# Patient Record
Sex: FEMALE | Race: WHITE | Employment: UNEMPLOYED | URBAN - NONMETROPOLITAN AREA
[De-identification: names, ages, dates, MRNs, and addresses within clinical notes are randomized per-mention and may not be internally consistent; named-entity substitution may affect disease eponyms.]

---

## 2017-01-18 ENCOUNTER — OFFICE VISIT (OUTPATIENT)
Dept: PSYCHIATRY | Age: 27
End: 2017-01-18

## 2017-01-18 DIAGNOSIS — F31.81 BIPOLAR 2 DISORDER (HCC): Primary | ICD-10-CM

## 2017-01-18 PROCEDURE — 99213 OFFICE O/P EST LOW 20 MIN: CPT | Performed by: NURSE PRACTITIONER

## 2017-04-26 ENCOUNTER — OFFICE VISIT (OUTPATIENT)
Dept: PSYCHIATRY | Age: 27
End: 2017-04-26

## 2017-04-26 DIAGNOSIS — F31.81 BIPOLAR 2 DISORDER (HCC): Primary | ICD-10-CM

## 2017-04-26 PROCEDURE — 99213 OFFICE O/P EST LOW 20 MIN: CPT | Performed by: NURSE PRACTITIONER

## 2017-04-26 RX ORDER — SERTRALINE HYDROCHLORIDE 100 MG/1
100 TABLET, FILM COATED ORAL DAILY
Qty: 30 TABLET | Refills: 1 | Status: SHIPPED | OUTPATIENT
Start: 2017-04-26 | End: 2017-05-24 | Stop reason: SDUPTHER

## 2017-05-24 ENCOUNTER — OFFICE VISIT (OUTPATIENT)
Dept: PSYCHIATRY | Age: 27
End: 2017-05-24

## 2017-05-24 DIAGNOSIS — F31.81 BIPOLAR 2 DISORDER (HCC): Primary | ICD-10-CM

## 2017-05-24 PROCEDURE — 99213 OFFICE O/P EST LOW 20 MIN: CPT | Performed by: NURSE PRACTITIONER

## 2017-05-24 RX ORDER — SERTRALINE HYDROCHLORIDE 100 MG/1
100 TABLET, FILM COATED ORAL DAILY
Qty: 30 TABLET | Refills: 2 | Status: SHIPPED | OUTPATIENT
Start: 2017-05-24 | End: 2017-07-10 | Stop reason: SDUPTHER

## 2017-07-10 RX ORDER — SERTRALINE HYDROCHLORIDE 100 MG/1
100 TABLET, FILM COATED ORAL DAILY
Qty: 30 TABLET | Refills: 2 | Status: SHIPPED | OUTPATIENT
Start: 2017-07-10 | End: 2017-09-06 | Stop reason: SDUPTHER

## 2017-07-24 ENCOUNTER — HOSPITAL ENCOUNTER (OUTPATIENT)
Age: 27
Setting detail: OBSERVATION
Discharge: PSYCHIATRIC OR SUBSTANCE USE FACILITY | DRG: 751 | End: 2017-07-25
Attending: EMERGENCY MEDICINE | Admitting: INTERNAL MEDICINE
Payer: COMMERCIAL

## 2017-07-24 DIAGNOSIS — R45.1 AGITATION: ICD-10-CM

## 2017-07-24 DIAGNOSIS — T50.902A OVERDOSE, INTENTIONAL SELF-HARM, INITIAL ENCOUNTER (HCC): Primary | ICD-10-CM

## 2017-07-24 LAB
ACETAMINOPHEN LEVEL: < 5 UG/ML (ref 0–20)
ALBUMIN SERPL-MCNC: 4 G/DL (ref 3.5–5.1)
ALP BLD-CCNC: 94 U/L (ref 38–126)
ALT SERPL-CCNC: 9 U/L (ref 11–66)
AMPHETAMINE+METHAMPHETAMINE URINE SCREEN: NEGATIVE
ANION GAP SERPL CALCULATED.3IONS-SCNC: 14 MEQ/L (ref 8–16)
AST SERPL-CCNC: 11 U/L (ref 5–40)
BARBITURATE QUANTITATIVE URINE: NEGATIVE
BASOPHILS # BLD: 0.7 %
BASOPHILS ABSOLUTE: 0.1 THOU/MM3 (ref 0–0.1)
BENZODIAZEPINE QUANTITATIVE URINE: NEGATIVE
BILIRUB SERPL-MCNC: < 0.2 MG/DL (ref 0.3–1.2)
BILIRUBIN DIRECT: < 0.2 MG/DL (ref 0–0.3)
BUN BLDV-MCNC: 10 MG/DL (ref 7–22)
CALCIUM SERPL-MCNC: 8.7 MG/DL (ref 8.5–10.5)
CANNABINOID QUANTITATIVE URINE: POSITIVE
CHLORIDE BLD-SCNC: 104 MEQ/L (ref 98–111)
CO2: 23 MEQ/L (ref 23–33)
COCAINE METABOLITE QUANTITATIVE URINE: NEGATIVE
CREAT SERPL-MCNC: 0.7 MG/DL (ref 0.4–1.2)
EOSINOPHIL # BLD: 1.3 %
EOSINOPHILS ABSOLUTE: 0.2 THOU/MM3 (ref 0–0.4)
ETHYL ALCOHOL, SERUM: < 0.01 %
GFR SERPL CREATININE-BSD FRML MDRD: > 90 ML/MIN/1.73M2
GLUCOSE BLD-MCNC: 115 MG/DL (ref 70–108)
HCT VFR BLD CALC: 42.9 % (ref 37–47)
HEMOGLOBIN: 14.5 GM/DL (ref 12–16)
LYMPHOCYTES # BLD: 28.6 %
LYMPHOCYTES ABSOLUTE: 3.3 THOU/MM3 (ref 1–4.8)
MCH RBC QN AUTO: 31.8 PG (ref 27–31)
MCHC RBC AUTO-ENTMCNC: 33.9 GM/DL (ref 33–37)
MCV RBC AUTO: 93.7 FL (ref 81–99)
MONOCYTES # BLD: 5.9 %
MONOCYTES ABSOLUTE: 0.7 THOU/MM3 (ref 0.4–1.3)
NUCLEATED RED BLOOD CELLS: 0 /100 WBC
OPIATES, URINE: NEGATIVE
OSMOLALITY CALCULATION: 281.2 MOSMOL/KG (ref 275–300)
OXYCODONE: NEGATIVE
PDW BLD-RTO: 13.9 % (ref 11.5–14.5)
PHENCYCLIDINE QUANTITATIVE URINE: POSITIVE
PLATELET # BLD: 363 THOU/MM3 (ref 130–400)
PMV BLD AUTO: 6.9 MCM (ref 7.4–10.4)
POTASSIUM SERPL-SCNC: 4 MEQ/L (ref 3.5–5.2)
PREGNANCY, SERUM: NEGATIVE
RBC # BLD: 4.58 MILL/MM3 (ref 4.2–5.4)
RBC # BLD: NORMAL 10*6/UL
SALICYLATE, SERUM: < 0.3 MG/DL (ref 2–10)
SEG NEUTROPHILS: 63.5 %
SEGMENTED NEUTROPHILS ABSOLUTE COUNT: 7.4 THOU/MM3 (ref 1.8–7.7)
SODIUM BLD-SCNC: 141 MEQ/L (ref 135–145)
TOTAL PROTEIN: 7 G/DL (ref 6.1–8)
TSH SERPL DL<=0.05 MIU/L-ACNC: 5.12 UIU/ML (ref 0.4–4.2)
WBC # BLD: 11.6 THOU/MM3 (ref 4.8–10.8)

## 2017-07-24 PROCEDURE — 80329 ANALGESICS NON-OPIOID 1 OR 2: CPT

## 2017-07-24 PROCEDURE — 99220 PR INITIAL OBSERVATION CARE/DAY 70 MINUTES: CPT | Performed by: INTERNAL MEDICINE

## 2017-07-24 PROCEDURE — 82248 BILIRUBIN DIRECT: CPT

## 2017-07-24 PROCEDURE — 93005 ELECTROCARDIOGRAM TRACING: CPT

## 2017-07-24 PROCEDURE — 99285 EMERGENCY DEPT VISIT HI MDM: CPT

## 2017-07-24 PROCEDURE — 84703 CHORIONIC GONADOTROPIN ASSAY: CPT

## 2017-07-24 PROCEDURE — 36415 COLL VENOUS BLD VENIPUNCTURE: CPT

## 2017-07-24 PROCEDURE — 84443 ASSAY THYROID STIM HORMONE: CPT

## 2017-07-24 PROCEDURE — 85025 COMPLETE CBC W/AUTO DIFF WBC: CPT

## 2017-07-24 PROCEDURE — 96375 TX/PRO/DX INJ NEW DRUG ADDON: CPT

## 2017-07-24 PROCEDURE — 80320 DRUG SCREEN QUANTALCOHOLS: CPT

## 2017-07-24 PROCEDURE — G0378 HOSPITAL OBSERVATION PER HR: HCPCS

## 2017-07-24 PROCEDURE — 80307 DRUG TEST PRSMV CHEM ANLYZR: CPT

## 2017-07-24 PROCEDURE — 2580000003 HC RX 258: Performed by: INTERNAL MEDICINE

## 2017-07-24 PROCEDURE — 90791 PSYCH DIAGNOSTIC EVALUATION: CPT | Performed by: NURSE PRACTITIONER

## 2017-07-24 PROCEDURE — G0480 DRUG TEST DEF 1-7 CLASSES: HCPCS

## 2017-07-24 PROCEDURE — 6360000002 HC RX W HCPCS

## 2017-07-24 PROCEDURE — 96361 HYDRATE IV INFUSION ADD-ON: CPT

## 2017-07-24 PROCEDURE — 80053 COMPREHEN METABOLIC PANEL: CPT

## 2017-07-24 PROCEDURE — 2580000003 HC RX 258: Performed by: EMERGENCY MEDICINE

## 2017-07-24 PROCEDURE — 6360000002 HC RX W HCPCS: Performed by: EMERGENCY MEDICINE

## 2017-07-24 PROCEDURE — 96374 THER/PROPH/DIAG INJ IV PUSH: CPT

## 2017-07-24 RX ORDER — LORAZEPAM 2 MG/ML
1 INJECTION INTRAMUSCULAR ONCE
Status: COMPLETED | OUTPATIENT
Start: 2017-07-24 | End: 2017-07-24

## 2017-07-24 RX ORDER — ONDANSETRON 2 MG/ML
INJECTION INTRAMUSCULAR; INTRAVENOUS
Status: COMPLETED
Start: 2017-07-24 | End: 2017-07-24

## 2017-07-24 RX ORDER — ONDANSETRON 2 MG/ML
4 INJECTION INTRAMUSCULAR; INTRAVENOUS EVERY 6 HOURS PRN
Status: DISCONTINUED | OUTPATIENT
Start: 2017-07-24 | End: 2017-07-25 | Stop reason: HOSPADM

## 2017-07-24 RX ORDER — SODIUM CHLORIDE 9 MG/ML
INJECTION, SOLUTION INTRAVENOUS CONTINUOUS
Status: DISCONTINUED | OUTPATIENT
Start: 2017-07-24 | End: 2017-07-25 | Stop reason: HOSPADM

## 2017-07-24 RX ORDER — SODIUM CHLORIDE 0.9 % (FLUSH) 0.9 %
10 SYRINGE (ML) INJECTION PRN
Status: DISCONTINUED | OUTPATIENT
Start: 2017-07-24 | End: 2017-07-25 | Stop reason: HOSPADM

## 2017-07-24 RX ORDER — ONDANSETRON 2 MG/ML
4 INJECTION INTRAMUSCULAR; INTRAVENOUS ONCE
Status: COMPLETED | OUTPATIENT
Start: 2017-07-24 | End: 2017-07-24

## 2017-07-24 RX ORDER — SODIUM CHLORIDE 450 MG/100ML
INJECTION, SOLUTION INTRAVENOUS CONTINUOUS
Status: DISCONTINUED | OUTPATIENT
Start: 2017-07-24 | End: 2017-07-25 | Stop reason: HOSPADM

## 2017-07-24 RX ORDER — SODIUM CHLORIDE 0.9 % (FLUSH) 0.9 %
10 SYRINGE (ML) INJECTION EVERY 12 HOURS SCHEDULED
Status: DISCONTINUED | OUTPATIENT
Start: 2017-07-24 | End: 2017-07-25 | Stop reason: HOSPADM

## 2017-07-24 RX ADMIN — SODIUM CHLORIDE, PRESERVATIVE FREE 10 ML: 5 INJECTION INTRAVENOUS at 08:28

## 2017-07-24 RX ADMIN — SODIUM CHLORIDE: 4.5 INJECTION, SOLUTION INTRAVENOUS at 21:00

## 2017-07-24 RX ADMIN — LORAZEPAM 1 MG: 2 INJECTION INTRAMUSCULAR; INTRAVENOUS at 03:22

## 2017-07-24 RX ADMIN — ONDANSETRON 4 MG: 2 INJECTION INTRAMUSCULAR; INTRAVENOUS at 02:37

## 2017-07-24 RX ADMIN — Medication 10 ML: at 21:00

## 2017-07-24 RX ADMIN — SODIUM CHLORIDE: 4.5 INJECTION, SOLUTION INTRAVENOUS at 08:27

## 2017-07-24 RX ADMIN — SODIUM CHLORIDE: 4.5 INJECTION, SOLUTION INTRAVENOUS at 18:56

## 2017-07-24 RX ADMIN — SODIUM CHLORIDE: 9 INJECTION, SOLUTION INTRAVENOUS at 01:55

## 2017-07-24 ASSESSMENT — PAIN SCALES - GENERAL: PAINLEVEL_OUTOF10: 0

## 2017-07-24 ASSESSMENT — ENCOUNTER SYMPTOMS
NAUSEA: 0
BACK PAIN: 0
DIARRHEA: 0
WHEEZING: 0
COUGH: 0
ABDOMINAL PAIN: 0
SHORTNESS OF BREATH: 0
BLOOD IN STOOL: 0
VOMITING: 0
SORE THROAT: 0

## 2017-07-25 ENCOUNTER — HOSPITAL ENCOUNTER (INPATIENT)
Age: 27
LOS: 2 days | Discharge: HOME | DRG: 751 | End: 2017-07-27
Attending: PSYCHIATRY & NEUROLOGY | Admitting: PSYCHIATRY & NEUROLOGY
Payer: COMMERCIAL

## 2017-07-25 VITALS
TEMPERATURE: 98.4 F | BODY MASS INDEX: 36.62 KG/M2 | HEART RATE: 82 BPM | RESPIRATION RATE: 18 BRPM | SYSTOLIC BLOOD PRESSURE: 149 MMHG | HEIGHT: 68 IN | DIASTOLIC BLOOD PRESSURE: 99 MMHG | WEIGHT: 241.6 LBS | OXYGEN SATURATION: 94 %

## 2017-07-25 LAB
ANION GAP SERPL CALCULATED.3IONS-SCNC: 13 MEQ/L (ref 8–16)
BUN BLDV-MCNC: 15 MG/DL (ref 7–22)
CALCIUM SERPL-MCNC: 8.8 MG/DL (ref 8.5–10.5)
CHLORIDE BLD-SCNC: 101 MEQ/L (ref 98–111)
CO2: 24 MEQ/L (ref 23–33)
CREAT SERPL-MCNC: 0.9 MG/DL (ref 0.4–1.2)
GFR SERPL CREATININE-BSD FRML MDRD: 75 ML/MIN/1.73M2
GLUCOSE BLD-MCNC: 94 MG/DL (ref 70–108)
HCT VFR BLD CALC: 42.4 % (ref 37–47)
HEMOGLOBIN: 14.3 GM/DL (ref 12–16)
MCH RBC QN AUTO: 32.2 PG (ref 27–31)
MCHC RBC AUTO-ENTMCNC: 33.6 GM/DL (ref 33–37)
MCV RBC AUTO: 95.6 FL (ref 81–99)
PDW BLD-RTO: 13.3 % (ref 11.5–14.5)
PLATELET # BLD: 297 THOU/MM3 (ref 130–400)
PMV BLD AUTO: 6.7 MCM (ref 7.4–10.4)
POTASSIUM SERPL-SCNC: 4.3 MEQ/L (ref 3.5–5.2)
RBC # BLD: 4.43 MILL/MM3 (ref 4.2–5.4)
SODIUM BLD-SCNC: 138 MEQ/L (ref 135–145)
WBC # BLD: 9.4 THOU/MM3 (ref 4.8–10.8)

## 2017-07-25 PROCEDURE — A6198 ALGINATE DRESSING > 48 SQ IN: HCPCS

## 2017-07-25 PROCEDURE — 36415 COLL VENOUS BLD VENIPUNCTURE: CPT

## 2017-07-25 PROCEDURE — 96361 HYDRATE IV INFUSION ADD-ON: CPT

## 2017-07-25 PROCEDURE — 1240000000 HC EMOTIONAL WELLNESS R&B

## 2017-07-25 PROCEDURE — 99217 PR OBSERVATION CARE DISCHARGE MANAGEMENT: CPT | Performed by: NURSE PRACTITIONER

## 2017-07-25 PROCEDURE — 80048 BASIC METABOLIC PNL TOTAL CA: CPT

## 2017-07-25 PROCEDURE — G0378 HOSPITAL OBSERVATION PER HR: HCPCS

## 2017-07-25 PROCEDURE — 85027 COMPLETE CBC AUTOMATED: CPT

## 2017-07-25 PROCEDURE — 2580000003 HC RX 258: Performed by: INTERNAL MEDICINE

## 2017-07-25 RX ORDER — LAMOTRIGINE 100 MG/1
400 TABLET ORAL DAILY
Status: DISCONTINUED | OUTPATIENT
Start: 2017-07-27 | End: 2017-07-27 | Stop reason: HOSPADM

## 2017-07-25 RX ORDER — LAMOTRIGINE 100 MG/1
400 TABLET ORAL DAILY
Status: CANCELLED | OUTPATIENT
Start: 2017-07-27

## 2017-07-25 RX ADMIN — SODIUM CHLORIDE: 4.5 INJECTION, SOLUTION INTRAVENOUS at 06:49

## 2017-07-25 ASSESSMENT — PAIN SCALES - GENERAL
PAINLEVEL_OUTOF10: 0

## 2017-07-25 ASSESSMENT — SLEEP AND FATIGUE QUESTIONNAIRES
DO YOU USE A SLEEP AID: NO
AVERAGE NUMBER OF SLEEP HOURS: 10
DO YOU HAVE DIFFICULTY SLEEPING: NO

## 2017-07-26 LAB
EKG ATRIAL RATE: 79 BPM
EKG P AXIS: 42 DEGREES
EKG P-R INTERVAL: 132 MS
EKG Q-T INTERVAL: 368 MS
EKG QRS DURATION: 82 MS
EKG QTC CALCULATION (BAZETT): 421 MS
EKG R AXIS: 18 DEGREES
EKG T AXIS: 18 DEGREES
EKG VENTRICULAR RATE: 79 BPM

## 2017-07-26 PROCEDURE — 6370000000 HC RX 637 (ALT 250 FOR IP): Performed by: NURSE PRACTITIONER

## 2017-07-26 PROCEDURE — 90792 PSYCH DIAG EVAL W/MED SRVCS: CPT | Performed by: NURSE PRACTITIONER

## 2017-07-26 PROCEDURE — 1240000000 HC EMOTIONAL WELLNESS R&B

## 2017-07-26 PROCEDURE — 6370000000 HC RX 637 (ALT 250 FOR IP): Performed by: PSYCHIATRY & NEUROLOGY

## 2017-07-26 RX ORDER — ACETAMINOPHEN 325 MG/1
650 TABLET ORAL EVERY 8 HOURS PRN
Status: DISCONTINUED | OUTPATIENT
Start: 2017-07-26 | End: 2017-07-26

## 2017-07-26 RX ORDER — ACETAMINOPHEN 325 MG/1
650 TABLET ORAL EVERY 8 HOURS PRN
Status: DISCONTINUED | OUTPATIENT
Start: 2017-07-26 | End: 2017-07-27

## 2017-07-26 RX ORDER — TRAZODONE HYDROCHLORIDE 50 MG/1
50 TABLET ORAL NIGHTLY PRN
Status: DISCONTINUED | OUTPATIENT
Start: 2017-07-26 | End: 2017-07-27 | Stop reason: HOSPADM

## 2017-07-26 RX ORDER — SERTRALINE HYDROCHLORIDE 100 MG/1
100 TABLET, FILM COATED ORAL DAILY
Status: DISCONTINUED | OUTPATIENT
Start: 2017-07-26 | End: 2017-07-27 | Stop reason: HOSPADM

## 2017-07-26 RX ADMIN — ACETAMINOPHEN 650 MG: 325 TABLET ORAL at 15:11

## 2017-07-26 RX ADMIN — SERTRALINE 100 MG: 100 TABLET, FILM COATED ORAL at 12:12

## 2017-07-26 ASSESSMENT — PAIN DESCRIPTION - PROGRESSION: CLINICAL_PROGRESSION: GRADUALLY IMPROVING

## 2017-07-26 ASSESSMENT — PAIN DESCRIPTION - ONSET: ONSET: ON-GOING

## 2017-07-26 ASSESSMENT — PAIN DESCRIPTION - ORIENTATION: ORIENTATION: LOWER

## 2017-07-26 ASSESSMENT — PAIN SCALES - GENERAL
PAINLEVEL_OUTOF10: 1
PAINLEVEL_OUTOF10: 7
PAINLEVEL_OUTOF10: 2

## 2017-07-26 ASSESSMENT — PAIN DESCRIPTION - FREQUENCY: FREQUENCY: INTERMITTENT

## 2017-07-26 ASSESSMENT — PAIN DESCRIPTION - LOCATION: LOCATION: PELVIS

## 2017-07-26 ASSESSMENT — PAIN DESCRIPTION - PAIN TYPE: TYPE: ACUTE PAIN

## 2017-07-26 ASSESSMENT — PAIN DESCRIPTION - DESCRIPTORS: DESCRIPTORS: CRAMPING

## 2017-07-27 VITALS
OXYGEN SATURATION: 96 % | WEIGHT: 237 LBS | SYSTOLIC BLOOD PRESSURE: 128 MMHG | HEIGHT: 68 IN | TEMPERATURE: 97.4 F | BODY MASS INDEX: 35.92 KG/M2 | DIASTOLIC BLOOD PRESSURE: 94 MMHG | HEART RATE: 87 BPM | RESPIRATION RATE: 16 BRPM

## 2017-07-27 PROCEDURE — 99238 HOSP IP/OBS DSCHRG MGMT 30/<: CPT | Performed by: NURSE PRACTITIONER

## 2017-07-27 PROCEDURE — 5130000000 HC BRIDGE APPOINTMENT

## 2017-07-27 PROCEDURE — 6370000000 HC RX 637 (ALT 250 FOR IP): Performed by: PSYCHIATRY & NEUROLOGY

## 2017-07-27 PROCEDURE — 6370000000 HC RX 637 (ALT 250 FOR IP): Performed by: NURSE PRACTITIONER

## 2017-07-27 RX ORDER — LAMOTRIGINE 200 MG/1
400 TABLET ORAL DAILY
Qty: 60 TABLET | Refills: 0 | Status: ON HOLD | OUTPATIENT
Start: 2017-07-27 | End: 2020-06-01 | Stop reason: HOSPADM

## 2017-07-27 RX ORDER — TRAZODONE HYDROCHLORIDE 50 MG/1
50 TABLET ORAL NIGHTLY PRN
Qty: 30 TABLET | Refills: 0 | Status: ON HOLD | OUTPATIENT
Start: 2017-07-27 | End: 2020-06-01 | Stop reason: SDUPTHER

## 2017-07-27 RX ORDER — ACETAMINOPHEN 325 MG/1
650 TABLET ORAL EVERY 8 HOURS PRN
Status: DISCONTINUED | OUTPATIENT
Start: 2017-07-27 | End: 2017-07-27 | Stop reason: HOSPADM

## 2017-07-27 RX ADMIN — LAMOTRIGINE 400 MG: 100 TABLET ORAL at 08:01

## 2017-07-27 RX ADMIN — ACETAMINOPHEN 650 MG: 325 TABLET ORAL at 07:58

## 2017-07-27 RX ADMIN — SERTRALINE 100 MG: 100 TABLET, FILM COATED ORAL at 08:01

## 2017-07-27 ASSESSMENT — PAIN DESCRIPTION - ONSET: ONSET: ON-GOING

## 2017-07-27 ASSESSMENT — PAIN DESCRIPTION - ORIENTATION: ORIENTATION: LOWER;MID

## 2017-07-27 ASSESSMENT — PAIN DESCRIPTION - PAIN TYPE: TYPE: ACUTE PAIN

## 2017-07-27 ASSESSMENT — PAIN DESCRIPTION - PROGRESSION: CLINICAL_PROGRESSION: GRADUALLY IMPROVING

## 2017-07-27 ASSESSMENT — PAIN SCALES - GENERAL
PAINLEVEL_OUTOF10: 6
PAINLEVEL_OUTOF10: 4
PAINLEVEL_OUTOF10: 4

## 2017-07-27 ASSESSMENT — PAIN DESCRIPTION - DESCRIPTORS: DESCRIPTORS: CRAMPING

## 2017-07-27 ASSESSMENT — PAIN DESCRIPTION - LOCATION: LOCATION: ABDOMEN

## 2017-09-06 ENCOUNTER — OFFICE VISIT (OUTPATIENT)
Dept: PSYCHIATRY | Age: 27
End: 2017-09-06
Payer: COMMERCIAL

## 2017-09-06 DIAGNOSIS — F31.81 BIPOLAR 2 DISORDER (HCC): ICD-10-CM

## 2017-09-06 DIAGNOSIS — F60.3 BORDERLINE PERSONALITY DISORDER (HCC): Primary | ICD-10-CM

## 2017-09-06 PROCEDURE — 99213 OFFICE O/P EST LOW 20 MIN: CPT | Performed by: NURSE PRACTITIONER

## 2017-09-06 RX ORDER — SERTRALINE HYDROCHLORIDE 100 MG/1
100 TABLET, FILM COATED ORAL DAILY
Qty: 30 TABLET | Refills: 1 | Status: SHIPPED | OUTPATIENT
Start: 2017-09-06 | End: 2017-10-04 | Stop reason: SDUPTHER

## 2017-10-04 ENCOUNTER — OFFICE VISIT (OUTPATIENT)
Dept: PSYCHIATRY | Age: 27
End: 2017-10-04
Payer: COMMERCIAL

## 2017-10-04 DIAGNOSIS — F60.3 BORDERLINE PERSONALITY DISORDER (HCC): ICD-10-CM

## 2017-10-04 DIAGNOSIS — F31.81 BIPOLAR II DISORDER (HCC): ICD-10-CM

## 2017-10-04 DIAGNOSIS — F12.10 CANNABIS ABUSE: ICD-10-CM

## 2017-10-04 PROCEDURE — 99213 OFFICE O/P EST LOW 20 MIN: CPT | Performed by: NURSE PRACTITIONER

## 2017-10-04 RX ORDER — SERTRALINE HYDROCHLORIDE 100 MG/1
100 TABLET, FILM COATED ORAL DAILY
Qty: 30 TABLET | Refills: 1 | Status: ON HOLD | OUTPATIENT
Start: 2017-10-04 | End: 2020-06-01 | Stop reason: HOSPADM

## 2017-10-04 NOTE — PROGRESS NOTES
Subjective:      Patient ID: Anai Major is a 31 y.o. female. HPI  Herbert Kohler is seen for follow up and medication for medication management. Feels current meds are working well. Denies side effects from meds. Denies having a rash. Denies depression. Denies feelings of self harm. Reports appetite is good and is sleeping well. Reports broke up with boyfriend which is a good thing and is now living with brother in [facility] and feel this is a good place. States she is in the process of finding a counselor. Reviewed labs drawn 7-24-17 and &-25-17 no critical abnormals noted. Noted urine tox screen positive for cannabis Reports smoking cannabis daily. Denies this being a problem. Reports last had seizure 2 weeks ago when broke up with boyfriend.       Past Medical Hx:  Reports allergy to Ibuprofen. States makes her Nauseated. Denies any other drug allergies  Reports having a seizure D/O. Denies any other chronic or acute medical illnesses. Denies possibility of being pregnant. Reports just finished menses. Reports will start Birth control soon.       Past Psychiatric Hx:  Reports one psych hospitalization at [facility], April 2016. Denies any past suicidal gestures. Reports being under care of psychiatrist in [place]. Reports past psych meds. Xanax. Zoloft. Lamictal.     Family Hx:  Not sure of any family Hx of mental illness.      Social Hx:  TOBACCO: Reports smokes 1 pk cigarettes every 2-4 days  ETOH: Reports being a social drinker 2 x monthly  DRUGS: Reports uses cannabis 4-5 days a week. Last used yesterday. MARITAL STATUS: Never . Currently in relationship for one year. Reports boyfriend is supportive. 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   LEVEL OF EDUCATION: Left school in 11th grade  LIVING SITUATION: Lives with brother in [address] [place] [address]  LEGAL: Arrested for marijuana possession.      MSE:  Level of consciousness: Alert  Appearance: in chair and fair grooming

## 2017-10-04 NOTE — MR AVS SNAPSHOT
After Visit Summary             Kate Overton   10/4/2017 5:00 PM   Office Visit    Description:  Female : 1990   Provider:  Nati Pearson CNP   Department:  [facility]              Your Follow-Up and Future Appointments         Below is a list of your follow-up and future appointments. This may not be a complete list as you may have made appointments directly with providers that we are not aware of or your providers may have made some for you. Please call your providers to confirm appointments. It is important to keep your appointments. Please bring your current insurance card, photo ID, co-pay, and all medication bottles to your appointment. If self-pay, payment is expected at the time of service. Information from Your Visit        Department     Name Address Phone Fax    [facility] [address] [facility] [phone]      You Were Seen for:         Comments    Bipolar II disorder [facility])   [[id number]]         Vital Signs     Smoking Status                   Current Some Day Smoker           Additional Information about your Body Mass Index (BMI)           Your BMI as listed above is considered obese (30 or more). BMI is an estimate of body fat, calculated from your height and weight. The higher your BMI, the greater your risk of heart disease, high blood pressure, type 2 diabetes, stroke, gallstones, arthritis, sleep apnea, and certain cancers. BMI is not perfect. It may overestimate body fat in athletes and people who are more muscular. Even a small weight loss (between 5 and 10 percent of your current weight) by decreasing your calorie intake and becoming more physically active will help lower your risk of developing or worsening diseases associated with obesity.      Learn more at: [email or web address]             Where to Get Your Medications You can get these medications from any pharmacy     Bring a paper prescription for each of these medications     sertraline 100 MG tablet         Your Current Medications Are              sertraline (ZOLOFT) 100 MG tablet Take 1 tablet by mouth daily    lamoTRIgine (LAMICTAL) 200 MG tablet Take 2 tablets by mouth daily    traZODone (DESYREL) 50 MG tablet Take 1 tablet by mouth nightly as needed for Sleep      Allergies              Ibuprofen     Causes stomach problems         Additional Information        Basic Information     Date Of Birth Sex Race Ethnicity Preferred Language Preferred Written Language    1990 Female White Non-/Non  English English      Problem List as of 10/4/2017  Date Reviewed: 10/4/2017                Bipolar II disorder ([facility] [address]    Overdose      Preventive Care        Date Due    HIV screening is recommended for all people regardless of risk factors  aged 15-65 years at least once (lifetime) who have never been HIV tested. 12/2/2004    Tetanus Combination Vaccine (1 - Tdap) 12/2/2008    Pneumococcal Vaccine - Pneumovax for adults aged 19-64 years with: chronic heart disease, chronic lung disease, diabetes mellitus, alcoholism, chronic liver disease, or cigarette smoking. (1 of 1 - PPSV23) 12/2/2008    Pap Smear 12/2/2010    Yearly Flu Vaccine (1) 9/1/2017            [employer] Signup           [employer] allows you to send messages to your doctor, view your test results, renew your prescriptions, schedule appointments, view visit notes, and more. How Do I Sign Up? 1. In your Internet browser, go to [email or web address]  2. Click on the Sign Up Now link in the Sign In box. You will see the New Member Sign Up page. 3. Enter your [employer] Access Code exactly as it appears below. You will not need to use this code after youve completed the sign-up process. If you do not sign up before the expiration date, you must request a new code.

## 2020-05-28 ENCOUNTER — HOSPITAL ENCOUNTER (INPATIENT)
Age: 30
LOS: 3 days | Discharge: HOME | DRG: 754 | End: 2020-06-01
Attending: PSYCHIATRY & NEUROLOGY | Admitting: PSYCHIATRY & NEUROLOGY
Payer: COMMERCIAL

## 2020-05-28 PROCEDURE — G0480 DRUG TEST DEF 1-7 CLASSES: HCPCS

## 2020-05-28 PROCEDURE — 99285 EMERGENCY DEPT VISIT HI MDM: CPT

## 2020-05-28 PROCEDURE — 80053 COMPREHEN METABOLIC PANEL: CPT

## 2020-05-28 PROCEDURE — 85025 COMPLETE CBC W/AUTO DIFF WBC: CPT

## 2020-05-28 PROCEDURE — 82248 BILIRUBIN DIRECT: CPT

## 2020-05-28 PROCEDURE — 36415 COLL VENOUS BLD VENIPUNCTURE: CPT

## 2020-05-28 PROCEDURE — 84443 ASSAY THYROID STIM HORMONE: CPT

## 2020-05-28 PROCEDURE — 84703 CHORIONIC GONADOTROPIN ASSAY: CPT

## 2020-05-28 RX ORDER — ACETAMINOPHEN 325 MG/1
650 TABLET ORAL ONCE
Status: COMPLETED | OUTPATIENT
Start: 2020-05-29 | End: 2020-05-29

## 2020-05-28 RX ORDER — LORAZEPAM 1 MG/1
1 TABLET ORAL ONCE
Status: COMPLETED | OUTPATIENT
Start: 2020-05-29 | End: 2020-05-29

## 2020-05-28 ASSESSMENT — PAIN DESCRIPTION - LOCATION: LOCATION: HAND

## 2020-05-28 ASSESSMENT — PAIN DESCRIPTION - ORIENTATION: ORIENTATION: RIGHT

## 2020-05-28 ASSESSMENT — PAIN SCALES - GENERAL: PAINLEVEL_OUTOF10: 3

## 2020-05-29 PROBLEM — F32.9 MAJOR DEPRESSIVE DISORDER, SINGLE EPISODE: Status: ACTIVE | Noted: 2020-05-29

## 2020-05-29 PROBLEM — F33.2 SEVERE EPISODE OF RECURRENT MAJOR DEPRESSIVE DISORDER, WITHOUT PSYCHOTIC FEATURES (HCC): Status: ACTIVE | Noted: 2020-05-29

## 2020-05-29 LAB
ACETAMINOPHEN LEVEL: < 5 UG/ML (ref 0–20)
ALBUMIN SERPL-MCNC: 4.1 G/DL (ref 3.5–5.1)
ALP BLD-CCNC: 73 U/L (ref 38–126)
ALT SERPL-CCNC: 16 U/L (ref 11–66)
AMPHETAMINE+METHAMPHETAMINE URINE SCREEN: NEGATIVE
ANION GAP SERPL CALCULATED.3IONS-SCNC: 12 MEQ/L (ref 8–16)
AST SERPL-CCNC: 17 U/L (ref 5–40)
ATYPICAL LYMPHOCYTES: ABNORMAL %
BARBITURATE QUANTITATIVE URINE: NEGATIVE
BASOPHILS # BLD: 0.7 %
BASOPHILS ABSOLUTE: 0.1 THOU/MM3 (ref 0–0.1)
BENZODIAZEPINE QUANTITATIVE URINE: NEGATIVE
BILIRUB SERPL-MCNC: 0.2 MG/DL (ref 0.3–1.2)
BILIRUBIN DIRECT: < 0.2 MG/DL (ref 0–0.3)
BILIRUBIN URINE: NEGATIVE
BLOOD, URINE: NEGATIVE
BUN BLDV-MCNC: 16 MG/DL (ref 7–22)
CALCIUM SERPL-MCNC: 9.6 MG/DL (ref 8.5–10.5)
CANNABINOID QUANTITATIVE URINE: POSITIVE
CHARACTER, URINE: CLEAR
CHLORIDE BLD-SCNC: 101 MEQ/L (ref 98–111)
CO2: 23 MEQ/L (ref 23–33)
COCAINE METABOLITE QUANTITATIVE URINE: NEGATIVE
COLOR: YELLOW
CREAT SERPL-MCNC: 0.8 MG/DL (ref 0.4–1.2)
EOSINOPHIL # BLD: 1.5 %
EOSINOPHILS ABSOLUTE: 0.2 THOU/MM3 (ref 0–0.4)
ERYTHROCYTE [DISTWIDTH] IN BLOOD BY AUTOMATED COUNT: 12.4 % (ref 11.5–14.5)
ERYTHROCYTE [DISTWIDTH] IN BLOOD BY AUTOMATED COUNT: 46.5 FL (ref 35–45)
ETHYL ALCOHOL, SERUM: < 0.01 %
GFR SERPL CREATININE-BSD FRML MDRD: 84 ML/MIN/1.73M2
GLUCOSE BLD-MCNC: 87 MG/DL (ref 70–108)
GLUCOSE URINE: NEGATIVE MG/DL
HCT VFR BLD CALC: 45.1 % (ref 37–47)
HEMOGLOBIN: 14.5 GM/DL (ref 12–16)
IMMATURE GRANS (ABS): 0.04 THOU/MM3 (ref 0–0.07)
IMMATURE GRANULOCYTES: 0.3 %
KETONES, URINE: ABNORMAL
LEUKOCYTE ESTERASE, URINE: NEGATIVE
LYMPHOCYTES # BLD: 33.7 %
LYMPHOCYTES ABSOLUTE: 4.4 THOU/MM3 (ref 1–4.8)
MCH RBC QN AUTO: 32.4 PG (ref 26–33)
MCHC RBC AUTO-ENTMCNC: 32.2 GM/DL (ref 32.2–35.5)
MCV RBC AUTO: 100.7 FL (ref 81–99)
MONOCYTES # BLD: 6.8 %
MONOCYTES ABSOLUTE: 0.9 THOU/MM3 (ref 0.4–1.3)
NITRITE, URINE: NEGATIVE
NUCLEATED RED BLOOD CELLS: 0 /100 WBC
OPIATES, URINE: NEGATIVE
OSMOLALITY CALCULATION: 272.5 MOSMOL/KG (ref 275–300)
OXYCODONE: NEGATIVE
PH UA: 5.5 (ref 5–9)
PHENCYCLIDINE QUANTITATIVE URINE: NEGATIVE
PLATELET # BLD: 338 THOU/MM3 (ref 130–400)
PLATELET ESTIMATE: ADEQUATE
PMV BLD AUTO: 8.7 FL (ref 9.4–12.4)
POTASSIUM SERPL-SCNC: 3.9 MEQ/L (ref 3.5–5.2)
PREGNANCY, SERUM: NEGATIVE
PROTEIN UA: NEGATIVE
RBC # BLD: 4.48 MILL/MM3 (ref 4.2–5.4)
SALICYLATE, SERUM: < 0.3 MG/DL (ref 2–10)
SCAN OF BLOOD SMEAR: NORMAL
SEG NEUTROPHILS: 57 %
SEGMENTED NEUTROPHILS ABSOLUTE COUNT: 7.5 THOU/MM3 (ref 1.8–7.7)
SODIUM BLD-SCNC: 136 MEQ/L (ref 135–145)
SPECIFIC GRAVITY, URINE: 1.03 (ref 1–1.03)
TOTAL PROTEIN: 6.9 G/DL (ref 6.1–8)
TSH SERPL DL<=0.05 MIU/L-ACNC: 6 UIU/ML (ref 0.4–4.2)
UROBILINOGEN, URINE: 1 EU/DL (ref 0–1)
WBC # BLD: 13.1 THOU/MM3 (ref 4.8–10.8)

## 2020-05-29 PROCEDURE — 81003 URINALYSIS AUTO W/O SCOPE: CPT

## 2020-05-29 PROCEDURE — 99223 1ST HOSP IP/OBS HIGH 75: CPT | Performed by: PSYCHIATRY & NEUROLOGY

## 2020-05-29 PROCEDURE — 1240000000 HC EMOTIONAL WELLNESS R&B

## 2020-05-29 PROCEDURE — 6370000000 HC RX 637 (ALT 250 FOR IP): Performed by: PSYCHIATRY & NEUROLOGY

## 2020-05-29 PROCEDURE — 6370000000 HC RX 637 (ALT 250 FOR IP): Performed by: PHYSICIAN ASSISTANT

## 2020-05-29 PROCEDURE — APPSS30 APP SPLIT SHARED TIME 16-30 MINUTES: Performed by: PHYSICIAN ASSISTANT

## 2020-05-29 PROCEDURE — 80307 DRUG TEST PRSMV CHEM ANLYZR: CPT

## 2020-05-29 RX ORDER — TRAZODONE HYDROCHLORIDE 50 MG/1
50 TABLET ORAL NIGHTLY PRN
Status: DISCONTINUED | OUTPATIENT
Start: 2020-05-29 | End: 2020-06-01 | Stop reason: HOSPADM

## 2020-05-29 RX ORDER — MAGNESIUM HYDROXIDE/ALUMINUM HYDROXICE/SIMETHICONE 120; 1200; 1200 MG/30ML; MG/30ML; MG/30ML
30 SUSPENSION ORAL EVERY 6 HOURS PRN
Status: DISCONTINUED | OUTPATIENT
Start: 2020-05-29 | End: 2020-06-01 | Stop reason: HOSPADM

## 2020-05-29 RX ORDER — NICOTINE 21 MG/24HR
1 PATCH, TRANSDERMAL 24 HOURS TRANSDERMAL DAILY
Status: DISCONTINUED | OUTPATIENT
Start: 2020-05-29 | End: 2020-05-30

## 2020-05-29 RX ORDER — VENLAFAXINE HYDROCHLORIDE 37.5 MG/1
37.5 CAPSULE, EXTENDED RELEASE ORAL
Status: DISCONTINUED | OUTPATIENT
Start: 2020-05-30 | End: 2020-06-01 | Stop reason: HOSPADM

## 2020-05-29 RX ORDER — HYDROXYZINE HYDROCHLORIDE 25 MG/1
50 TABLET, FILM COATED ORAL 3 TIMES DAILY PRN
Status: DISCONTINUED | OUTPATIENT
Start: 2020-05-29 | End: 2020-06-01 | Stop reason: HOSPADM

## 2020-05-29 RX ORDER — ACETAMINOPHEN 325 MG/1
650 TABLET ORAL EVERY 4 HOURS PRN
Status: DISCONTINUED | OUTPATIENT
Start: 2020-05-29 | End: 2020-06-01 | Stop reason: HOSPADM

## 2020-05-29 RX ADMIN — ACETAMINOPHEN 650 MG: 325 TABLET ORAL at 04:15

## 2020-05-29 RX ADMIN — ACETAMINOPHEN 650 MG: 325 TABLET ORAL at 21:37

## 2020-05-29 RX ADMIN — HYDROXYZINE HYDROCHLORIDE 50 MG: 25 TABLET ORAL at 04:15

## 2020-05-29 RX ADMIN — TRAZODONE HYDROCHLORIDE 50 MG: 50 TABLET ORAL at 22:25

## 2020-05-29 RX ADMIN — ACETAMINOPHEN 650 MG: 325 TABLET ORAL at 00:24

## 2020-05-29 RX ADMIN — LORAZEPAM 1 MG: 1 TABLET ORAL at 00:25

## 2020-05-29 ASSESSMENT — PAIN DESCRIPTION - PAIN TYPE
TYPE: ACUTE PAIN

## 2020-05-29 ASSESSMENT — PAIN DESCRIPTION - FREQUENCY
FREQUENCY: INTERMITTENT

## 2020-05-29 ASSESSMENT — SLEEP AND FATIGUE QUESTIONNAIRES
AVERAGE NUMBER OF SLEEP HOURS: 10
DO YOU USE A SLEEP AID: NO
DO YOU HAVE DIFFICULTY SLEEPING: NO
AVERAGE NUMBER OF SLEEP HOURS: 10
DO YOU USE A SLEEP AID: NO
DO YOU USE A SLEEP AID: NO

## 2020-05-29 ASSESSMENT — ENCOUNTER SYMPTOMS
RHINORRHEA: 0
VOMITING: 0
ABDOMINAL PAIN: 0
COLOR CHANGE: 0
DIARRHEA: 0
COUGH: 0
NAUSEA: 0
SORE THROAT: 0
SHORTNESS OF BREATH: 0
BACK PAIN: 0
CONSTIPATION: 0

## 2020-05-29 ASSESSMENT — PAIN DESCRIPTION - LOCATION
LOCATION: HAND

## 2020-05-29 ASSESSMENT — PATIENT HEALTH QUESTIONNAIRE - PHQ9
SUM OF ALL RESPONSES TO PHQ QUESTIONS 1-9: 20
SUM OF ALL RESPONSES TO PHQ QUESTIONS 1-9: 20
SUM OF ALL RESPONSES TO PHQ QUESTIONS 1-9: 15

## 2020-05-29 ASSESSMENT — PAIN DESCRIPTION - DESCRIPTORS
DESCRIPTORS: BURNING
DESCRIPTORS: ACHING;TINGLING;BURNING
DESCRIPTORS: BURNING;CRAMPING

## 2020-05-29 ASSESSMENT — PAIN SCALES - GENERAL
PAINLEVEL_OUTOF10: 6
PAINLEVEL_OUTOF10: 4
PAINLEVEL_OUTOF10: 7
PAINLEVEL_OUTOF10: 6

## 2020-05-29 ASSESSMENT — PAIN DESCRIPTION - PROGRESSION
CLINICAL_PROGRESSION: GRADUALLY IMPROVING
CLINICAL_PROGRESSION: GRADUALLY IMPROVING

## 2020-05-29 ASSESSMENT — PAIN DESCRIPTION - ONSET
ONSET: GRADUAL

## 2020-05-29 ASSESSMENT — PAIN DESCRIPTION - ORIENTATION
ORIENTATION: RIGHT

## 2020-05-29 ASSESSMENT — PAIN - FUNCTIONAL ASSESSMENT
PAIN_FUNCTIONAL_ASSESSMENT: PREVENTS OR INTERFERES SOME ACTIVE ACTIVITIES AND ADLS

## 2020-05-29 ASSESSMENT — PAIN DESCRIPTION - DIRECTION: RADIATING_TOWARDS: UP ARM

## 2020-05-29 ASSESSMENT — LIFESTYLE VARIABLES
HISTORY_ALCOHOL_USE: NO
HISTORY_ALCOHOL_USE: NO

## 2020-05-29 NOTE — ED NOTES
Patient continues to be alert, active and cooperative with service dog and friend at bedside.   Sitter continued at bedside     Chad Rey RN  05/29/20 5010

## 2020-05-29 NOTE — ED NOTES
Kita Guzmán PAC in to  Evaluate patient.   Patient tearful with little eye contact     Selwyn Castro RN  05/28/20 1984

## 2020-05-29 NOTE — ED PROVIDER NOTES
[facility] emergency department encounter      CHIEF COMPLAINT       Chief Complaint   Patient presents with    Suicidal       Nurses Notes reviewed and I agree except asnoted in the HPI. HISTORY OFPRESENT ILLNESS    Edilson Orozco is a 26 y.o. female who presents to the emergency department for evaluation of suicidal ideation. The patient presents today with a friend at bedside. He reports that the patient is here today for evaluation of depression and suicidal thoughts. He reports that the patient has had progressively worsening suicidal thoughts for quite some time. Patient had right wrist surgery several weeks ago after the patient broke a salsa jar and cut her right wrist.  The patient herself states that she wants to \"fucking kills myself\". [facility] staff reports that the patient's suicidal ideation is due to relationship issues, and the patient reported that it would be better to \"end it all. \"  The patient does not give any current suicidal plans but does report suicidal attempt. The patient states that she drank a beer today. She denies any illicit drug use. The patient denies any fevers, chills, chest pain, shortness of breath, URI symptoms, nausea, vomiting, diarrhea, or constipation. There are no additional complaints at this time. REVIEW OF SYSTEMS      Review of Systems   Constitutional: Negative for chills, fatigue and fever. HENT: Negative for congestion, ear pain, rhinorrhea and sore throat. Respiratory: Negative for cough and shortness of breath. Cardiovascular: Negative for chest pain. Gastrointestinal: Negative for abdominal pain, constipation, diarrhea, nausea and vomiting. Musculoskeletal: Negative for arthralgias, back pain, myalgias and neck pain. Skin: Negative for color change and pallor. Neurological: Negative for dizziness, weakness, light-headedness, numbness and headaches. Hematological: Negative for adenopathy.    Psychiatric/Behavioral: moist.      Pharynx: Oropharynx is clear. Eyes:      General: No scleral icterus. Right eye: No discharge. Left eye: No discharge. Conjunctiva/sclera: Conjunctivae normal.      Pupils: Pupils are equal, round, and reactive to light. Neck:      Musculoskeletal: Normal range of motion. Cardiovascular:      Rate and Rhythm: Normal rate and regular rhythm. Heart sounds: Normal heart sounds. No murmur. No friction rub. No gallop. Pulmonary:      Effort: Pulmonary effort is normal. No respiratory distress. Breath sounds: Normal breath sounds. No stridor. No wheezing, rhonchi or rales. Chest:      Chest wall: No tenderness. Abdominal:      General: There is no distension. Palpations: Abdomen is soft. Musculoskeletal: Normal range of motion. Skin:     General: Skin is warm and dry. Coloration: Skin is not pale. Findings: No erythema or rash. Neurological:      Mental Status: She is alert and oriented to person, place, and time. GCS: GCS eye subscore is 4. GCS verbal subscore is 5. GCS motor subscore is 6. Motor: No abnormal muscle tone. Coordination: Coordination normal.   Psychiatric:         Attention and Perception: Attention normal.         Mood and Affect: Mood is depressed. Affect is tearful. Speech: Speech normal.         Behavior: Behavior normal.         Thought Content: Thought content includes suicidal ideation. Thought content does not include homicidal ideation. Thought content does not include homicidal plan.                DIFFERENTIAL DIAGNOSIS:   Includes but not limited to: Depression, suicidal ideation, anxiety, PTSD, adjustment disorder, bipolar disorder, mood disorder, alcohol intoxication    DIAGNOSTIC RESULTS     EKG: All EKG's are interpreted by the Emergency Department Physician who either signs or Co-signsthis chart in the absence of a cardiologist.  None     RADIOLOGY: non-plain film images(s) such as CT,
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
## 2020-05-29 NOTE — GROUP NOTE
Group Therapy Note    Date: 5/29/2020    Group Start Time: 0900  Group End Time: 0930  Group Topic: [employer]    [facility]    [address], [place]    Group Therapy Note    Attendees: 10         Pt did not attend 0900 goal group and community meeting. Pt received maximum encouragement to attend. Pt did not participate in 1:1 session.     Signature: Nelida Barnett

## 2020-05-30 PROCEDURE — 6370000000 HC RX 637 (ALT 250 FOR IP): Performed by: PHYSICIAN ASSISTANT

## 2020-05-30 PROCEDURE — 1240000000 HC EMOTIONAL WELLNESS R&B

## 2020-05-30 PROCEDURE — 6370000000 HC RX 637 (ALT 250 FOR IP): Performed by: PSYCHIATRY & NEUROLOGY

## 2020-05-30 PROCEDURE — 99232 SBSQ HOSP IP/OBS MODERATE 35: CPT | Performed by: PSYCHIATRY & NEUROLOGY

## 2020-05-30 PROCEDURE — 90833 PSYTX W PT W E/M 30 MIN: CPT | Performed by: PSYCHIATRY & NEUROLOGY

## 2020-05-30 RX ORDER — NICOTINE 21 MG/24HR
1 PATCH, TRANSDERMAL 24 HOURS TRANSDERMAL DAILY
Status: DISCONTINUED | OUTPATIENT
Start: 2020-05-30 | End: 2020-06-01 | Stop reason: HOSPADM

## 2020-05-30 RX ADMIN — HYDROXYZINE HYDROCHLORIDE 50 MG: 25 TABLET ORAL at 09:54

## 2020-05-30 RX ADMIN — VENLAFAXINE HYDROCHLORIDE 37.5 MG: 37.5 CAPSULE, EXTENDED RELEASE ORAL at 09:53

## 2020-05-30 ASSESSMENT — PAIN DESCRIPTION - LOCATION
LOCATION: HAND
LOCATION: HAND

## 2020-05-30 ASSESSMENT — PAIN - FUNCTIONAL ASSESSMENT
PAIN_FUNCTIONAL_ASSESSMENT: PREVENTS OR INTERFERES SOME ACTIVE ACTIVITIES AND ADLS
PAIN_FUNCTIONAL_ASSESSMENT: PREVENTS OR INTERFERES SOME ACTIVE ACTIVITIES AND ADLS

## 2020-05-30 ASSESSMENT — PAIN DESCRIPTION - ONSET
ONSET: ON-GOING
ONSET: GRADUAL

## 2020-05-30 ASSESSMENT — PAIN DESCRIPTION - DESCRIPTORS
DESCRIPTORS: ACHING
DESCRIPTORS: ACHING;BURNING;CRAMPING

## 2020-05-30 ASSESSMENT — PAIN DESCRIPTION - PAIN TYPE
TYPE: ACUTE PAIN
TYPE: ACUTE PAIN

## 2020-05-30 ASSESSMENT — PAIN DESCRIPTION - FREQUENCY
FREQUENCY: INTERMITTENT
FREQUENCY: INTERMITTENT

## 2020-05-30 ASSESSMENT — PAIN SCALES - GENERAL
PAINLEVEL_OUTOF10: 5
PAINLEVEL_OUTOF10: 5

## 2020-05-30 ASSESSMENT — PAIN DESCRIPTION - ORIENTATION
ORIENTATION: RIGHT
ORIENTATION: RIGHT

## 2020-05-30 ASSESSMENT — PAIN DESCRIPTION - PROGRESSION
CLINICAL_PROGRESSION: GRADUALLY IMPROVING
CLINICAL_PROGRESSION: GRADUALLY IMPROVING

## 2020-05-30 ASSESSMENT — PAIN DESCRIPTION - DIRECTION: RADIATING_TOWARDS: UP ARM

## 2020-05-30 NOTE — PLAN OF CARE
Ability to disclose and discuss suicidal ideas will improve  Description: Ability to disclose and discuss suicidal ideas will improve  5/29/2020 2227 by Sabino Wiseman RN  Outcome: Ongoing  Note: Pt denies self harm thoughts   5/29/2020 1753 by Misty Mckeon LPN  Outcome: Ongoing  Note: Patient denies suicidal ideations during shift assessment. Goal: Able to verbalize support systems  Description: Able to verbalize support systems  5/29/2020 2227 by Sabino Wiseman RN  Outcome: Ongoing  Note: Pt states she does have support from her boyfriend and dog  5/29/2020 1753 by Misty Mckeon LPN  Outcome: Ongoing  Note: Patient unable to verbalize support system during shift assessment. Goal: Absence of self-harm  Description: Absence of self-harm  5/29/2020 2227 by Sabino Wiseman RN  Outcome: Ongoing  Note: Pt denies thoughts of self harm  5/29/2020 1753 by Misty Mckeon LPN  Outcome: Ongoing  Note: Patient remained free from self-harming behavior at this time during shift. Goal: Patient specific goal  Description: Patient specific goal  5/29/2020 2227 by Sabino Wiseman RN  Outcome: Ongoing  Note: Pt working towards meeting goal tomorrow  5/29/2020 1753 by Misty Mckeon LPN  Outcome: Ongoing  Note: Daily goal is to \"go to groups\". Goal: Participates in care planning  Description: Participates in care planning  5/29/2020 2227 by Sabino Wiseman RN  Outcome: Ongoing  Note: Pt is taking medications, attending and participating in groups and care planning. Pt has good interaction with staff and peers   5/29/2020 1753 by Misty Mckeon LPN  Outcome: Ongoing  Note: Patient participates in care planning with staff during shift.      Problem: Neurological  Goal: Maximum potential motor/sensory/cognitive function  5/29/2020 2227 by Sabino Wiseman RN  Outcome: Ongoing  Note: Pt has good blood flow and feeling in fingers  5/29/2020 1753 by Misty Mckeon LPN  Outcome: Ongoing  Note: No seizure activity noted at this time during shift. Goal: Other  Description: No seizure activity   5/29/2020 2227 by Adrián Hernandes RN  Note: No seizure activity noted  5/29/2020 1753 by Mildred Mora LPN  Outcome: Ongoing  Note: Patient had no seizure activity noted during shift. Problem: Coping:  Goal: Ability to identify problematic behaviors that deter socialization will improve  Description: Ability to identify problematic behaviors that deter socialization will improve  5/29/2020 2227 by Adrián Hernandes RN  Outcome: Ongoing  Note: Pt out on unit and interacting well with peers  5/29/2020 1753 by Mildred Mora LPN  Outcome: Ongoing  Note: Patient out on unit for meals and needs. Appears to be on the fringe with peers during shift.  5/29/2020 1207 by Huel Lesch  Outcome: Ongoing     Problem: Role Relationship:  Goal: Ability to demonstrate positive changes in social behaviors and relationships will improve  Description: Ability to demonstrate positive changes in social behaviors and relationships will improve  5/29/2020 2227 by Adrián Hernandes RN  Outcome: Ongoing  Note: Pt out on unit and interacting well with peers  5/29/2020 1753 by Mildred Mora LPN  Outcome: Ongoing  Note: Patient has minimal interaction with peers while out on unit. Appears to be on the fringe at times. 5/29/2020 1207 by Huel Lesch  Outcome: Ongoing   Care plan reviewed with patient and  verbalize understanding of the plan of care and contribute to goal setting.

## 2020-05-30 NOTE — PLAN OF CARE
Problem: Suicide risk  Goal: Provide patient with safe environment  Description: Provide patient with safe environment  5/30/2020 0916 by Jewel Daly LPN  Outcome: Ongoing  Note: Patient provided with safe environment during admission. 5/29/2020 2227 by Diego Govea RN  Outcome: Ongoing  Note: Denies self harm thoughts, remains safe and free of harm     Problem: Pain:  Goal: Pain level will decrease  Description: Pain level will decrease  5/30/2020 0916 by Jewel Daly LPN  Outcome: Ongoing  Note: Patient reports pain in right hand. Rates pain 5/10 and describes pain as burning and cramping. PRN medication available as needed. 5/29/2020 2227 by Diego Govea RN  Outcome: Ongoing  Note: Pt reports pain of 4/10 in right hand and states that sensations change at times. PRN medication effective  Goal: Control of acute pain  Description: Control of acute pain  5/30/2020 0916 by Jewel Daly LPN  Outcome: Ongoing  Note: Patient reports pain in right hand. Rates pain 5/10 and describes pain as burning and cramping. PRN medication available as needed. 5/29/2020 2227 by Diego Govea RN  Outcome: Ongoing  Note: Pt reports pain of 4/10 in right hand and states that sensations change at times. PRN medication effective  Goal: Control of chronic pain  Description: Control of chronic pain  5/30/2020 0916 by Jewel Daly LPN  Outcome: Ongoing  Note: Patient reports pain in right hand. Rates pain 5/10 and describes pain as burning and cramping. PRN medication available as needed. 5/29/2020 2227 by Diego Govea RN  Outcome: Ongoing  Note: Pt reports pain of 4/10 in right hand and states that sensations change at times.  PRN medication effective     Problem: Discharge Planning:  Goal: Discharged to appropriate level of care  Description: Discharged to appropriate level of care  5/30/2020 0916 by Jewel Daly LPN  Outcome: Ongoing  Note: No discharge plans noted at this time during shift.  5/29/2020 2227 by Nicho Betancur RN  Outcome: Ongoing  Note: Pt plans to return home and follow up is unknown at this time     Problem: KNOWLEDGE DEFICIT  Goal: Knowledge - personal safety  5/30/2020 0916 by Flor Dunlap LPN  Outcome: Ongoing  Note: Patient did not complete safety plan at this time during shift.  5/29/2020 2227 by Nicho Betancur RN  Outcome: Ongoing  Note: Pt remains safe and free of harm     Problem: Skin Integrity:  Goal: Will show no infection signs and symptoms  Description: Will show no infection signs and symptoms  5/30/2020 0916 by Flor Dunlap LPN  Outcome: Ongoing  Note: No new signs or symptoms of infection on skin noted. 5/29/2020 2227 by Nicho Betancur RN  Outcome: Ongoing  Note: Laceration is healing well with no signs of infection  Goal: Absence of new skin breakdown  Description: Absence of new skin breakdown  5/30/2020 0916 by Flor Dunlap LPN  Outcome: Ongoing  Note: No new skin breakdown noted. 5/29/2020 2227 by Nicho Betancur RN  Outcome: Ongoing  Note: Pt denies any new breaks in skin     Problem: Anxiety:  Goal: Level of anxiety will decrease  Description: Level of anxiety will decrease  5/30/2020 0916 by Flor Dunlap LPN  Outcome: Ongoing  Note: Patient reports anxiety symptoms during shift assessment. Rates anxiety 5/10.  5/29/2020 2227 by Nicho Betancur RN  Outcome: Ongoing  Note: Pt denies anxiety, reports she does have some depression but does feel slightly better after talking with family     Problem: Depressive Behavior With or Without Suicide Precautions:  Goal: Able to verbalize and/or display a decrease in depressive symptoms  Description: Able to verbalize and/or display a decrease in depressive symptoms  5/30/2020 0916 by Flor Dunlap LPN  Outcome: Ongoing  Note: Patient denies depressive symptoms during shift assessment.   5/29/2020 2227 by Nicho Betancur RN  Outcome: Ongoing  Note: Pt denies anxiety, reports she does have some

## 2020-05-30 NOTE — BH NOTE
Group Therapy Note    Date: 5/29/2020  Start Time: 2000  End Time:  2020  Number of Participants: 1    Type of Group: Wrap-Up    Wellness Binder Information  Module Name:    Session Number:      Patient's Goal:  shower    Notes:  Going to meet goal tomorrow    Status After Intervention:  Improved    Participation Level: Active Listener    Participation Quality: Appropriate      Speech:  normal      Thought Process/Content: Logical      Affective Functioning: Congruent      Mood: depressed      Level of consciousness:  Alert      Response to Learning: Able to verbalize current knowledge/experience      Endings: None Reported    Modes of Intervention: Education      Discipline Responsible: Registered Nurse      Signature:   Marrion Homans, RN
INPATIENT RECREATIONAL THERAPY  ADULT BEHAVIORAL SERVICES  EVALUATION    REFERRING PHYSICIAN:  Dr. Kiya Matt  DIAGNOSIS:   Major Depressive Disorder  PRECAUTIONS: Standard Precautions     HISTORY OF PRESENT ILLNESS/INJURY: Patient is a 26year old female who presents to the ED voluntarily due to suicidal ideation. Patient reports stressors related to relationship issues. Patient reports present suicidal ideation. Patient reports 'it is better to end it now than go through'. PMH:  Please see medical chart for prior medical history, allergies, and medications. HISTORY OF PSYCHIATRIC TREATMENT: Pt was on 4E in 7/017. Pt reports not having any inpatient admissions elsewhere since. Pt reports not having an outpatient provider. YOB: 1990  GENDER:  Female    MARITAL STATUS:  Single  EMPLOYMENT STATUS:  Pt reports working at [employer]. LIVING SITUATION/SUPPORT:  Pt reports living alone with her dog. EDUCATIONAL LEVEL:  Graduate  MEDICATION/DRUG USE: Pt reports marijuana use daily. Pt reported occasional use, every few months, of mushrooms. Pt reported being non-compliant with medications but is going to try to take them as prescribed when discharged. LEISURE INTERESTS:  Reading, sewing, arts and crafts, watching TV/ Movies, listening to music, activities with family, pet care, playing with dog. ACTIVITY PREFERENCE: No preference  ACTIVITY TYPES:  passive, active, indoor, outdoor  COGNITION: A&Ox4    COPING: poor  ATTENTION: fair  RELAXATION: pt has a hx of anxiety  SELF-ESTEEM: poor  MOTIVATION:  fair- pt reports only doing things that have to be done    SOCIAL SKILLS:  fair  FRUSTRATION TOLERANCE:  No hx of violence documented  ATTENTION SEEKING: No attention seeking behaviors noted  COOPERATION: Pt was cooperative and pleasant  AFFECT: Pt displayed a congruent affect  APPEARANCE: Pt displays fair grooming and hygiene and is dressed in blue hospital scrubs.   Pt has a brace
(Comment)(cell phone  and ear buds, red suitcase, toiletrees, headband, right hand arm brace)     Admission order obtained yes. Valuables placed in safe in security envelope, number:  [id number]. Aliyah Jones Patient oriented to surroundings and program expectations and copy of patient rights given. Received admission packet:  yes. Consents reviewed, signed yes. Patient verbalize understanding:  yes. Patient education on precautions: yes           Patient screened positive for suicide risk on CSSR-S (\"yes\" to question #4, 5, OR 6)  yes. Physician notified of risk score. Orders received yes . Explained patients right to have family, representative or physician notified of their admission. Patient has Declined for physician to be notified. Patient has Declined for family/representative to be notified. Provided pt with [employer] handout entitled \"Quitting Smoking. \"  Reviewed handout with pt addressing dangers of smoking, developing coping skills, and providing basic information about quitting. Pt response to counseling:  Yes    Pt brought to unit with a voluntary but when she found that she would not be able to keep her phone pt refused and was demanding to leave. ER and  was called and pt to be placed under [address] [place] from ER. Pt significant other on the phone and worried and very upset about pt possible being able to leave due to the statements of self harm she has made and the fact that she smashed a jar of salsa on purpose with hopes of cutting self on the 5th resulting in major surgury. Pt does admit to being suicidal, feeling like there is no hope or reason to live. Pt states she has relationship stressors with her boyfriend, that he blames her and accuses of things and states that she is a liar and a manipulator. Pt reports family stressors, her father is sick with kidney failure and her mother is an alcoholic and the pt does not feel she can count on her siblings.  Pt reports since her

## 2020-05-31 PROCEDURE — 99232 SBSQ HOSP IP/OBS MODERATE 35: CPT | Performed by: PSYCHIATRY & NEUROLOGY

## 2020-05-31 PROCEDURE — 90833 PSYTX W PT W E/M 30 MIN: CPT | Performed by: PSYCHIATRY & NEUROLOGY

## 2020-05-31 PROCEDURE — 6370000000 HC RX 637 (ALT 250 FOR IP): Performed by: PHYSICIAN ASSISTANT

## 2020-05-31 PROCEDURE — 1240000000 HC EMOTIONAL WELLNESS R&B

## 2020-05-31 PROCEDURE — APPSS15 APP SPLIT SHARED TIME 0-15 MINUTES: Performed by: NURSE PRACTITIONER

## 2020-05-31 PROCEDURE — 6370000000 HC RX 637 (ALT 250 FOR IP): Performed by: PSYCHIATRY & NEUROLOGY

## 2020-05-31 RX ADMIN — VENLAFAXINE HYDROCHLORIDE 37.5 MG: 37.5 CAPSULE, EXTENDED RELEASE ORAL at 07:58

## 2020-05-31 RX ADMIN — TRAZODONE HYDROCHLORIDE 50 MG: 50 TABLET ORAL at 22:00

## 2020-05-31 RX ADMIN — HYDROXYZINE HYDROCHLORIDE 50 MG: 25 TABLET ORAL at 21:03

## 2020-05-31 RX ADMIN — MAGNESIUM HYDROXIDE 30 ML: 2400 SUSPENSION ORAL at 12:30

## 2020-05-31 ASSESSMENT — PAIN SCALES - GENERAL: PAINLEVEL_OUTOF10: 0

## 2020-05-31 ASSESSMENT — PAIN - FUNCTIONAL ASSESSMENT: PAIN_FUNCTIONAL_ASSESSMENT: PREVENTS OR INTERFERES SOME ACTIVE ACTIVITIES AND ADLS

## 2020-05-31 ASSESSMENT — PAIN DESCRIPTION - PROGRESSION: CLINICAL_PROGRESSION: GRADUALLY IMPROVING

## 2020-05-31 NOTE — PLAN OF CARE
Problem: Suicide risk  Goal: Provide patient with safe environment  Description: Provide patient with safe environment  Outcome: Met This Shift  Note: Safe environment maintained. Problem: Pain:  Goal: Pain level will decrease  Description: Pain level will decrease  Outcome: Ongoing  Note: Patient hasn't needed pain medications this shift. Goal: Control of acute pain  Description: Control of acute pain  Outcome: Ongoing  Note: Rates pain a 5/10, patient refuses pain medications. Goal: Control of chronic pain  Description: Control of chronic pain  Outcome: Ongoing  Note: Ongoing. Problem: Discharge Planning:  Goal: Discharged to appropriate level of care  Description: Discharged to appropriate level of care  Outcome: Ongoing  Note: Discharge planning in progress, patient not discharged this evening. Problem: Skin Integrity:  Goal: Will show no infection signs and symptoms  Description: Will show no infection signs and symptoms  Outcome: Met This Shift  Note: No signs or symptoms of infection. Goal: Absence of new skin breakdown  Description: Absence of new skin breakdown  Outcome: Met This Shift  Note: Will continue to monitor. Problem: Anxiety:  Goal: Level of anxiety will decrease  Description: Level of anxiety will decrease  Outcome: Met This Shift  Note: Patient denies anxiety during shift assessment. Problem: Depressive Behavior With or Without Suicide Precautions:  Goal: Able to verbalize and/or display a decrease in depressive symptoms  Description: Able to verbalize and/or display a decrease in depressive symptoms  Outcome: Ongoing  Note: Patient verbalizes feeling better. Goal: Ability to disclose and discuss suicidal ideas will improve  Description: Ability to disclose and discuss suicidal ideas will improve  Outcome: Met This Shift  Note: Patient denies suicidal thoughts.   Goal: Able to verbalize support systems  Description: Able to verbalize support systems  Outcome: Met This

## 2020-05-31 NOTE — PLAN OF CARE
Remains on every 15 minutes precautions for safety. 5/31/2020 0020 by Ama Dc RN  Outcome: Met This Shift  Note: Absence of self harm so far this evening. Problem: Coping:  Goal: Ability to identify problematic behaviors that deter socialization will improve  Description: Ability to identify problematic behaviors that deter socialization will improve  5/31/2020 1007 by Anjum Carrasco RN  Outcome: Met This Shift  Note: Pt has good interaction noted with peers  5/31/2020 0020 by Ama Dc RN  Note: Patient has been pleasant this shift. Out on unit socializing with peers and staff. Problem: Suicide risk  Goal: Provide patient with safe environment  Description: Provide patient with safe environment  5/31/2020 1007 by Anjum Carrasco RN  Outcome: Ongoing  Note: Maintained in safe and secure environment. 5/30/2020 2348 by Ama Dc RN  Outcome: Met This Shift  Note: Safe environment maintained. Problem: Discharge Planning:  Goal: Discharged to appropriate level of care  Description: Discharged to appropriate level of care  5/31/2020 1007 by Anjum Carrasco RN  Outcome: Ongoing  Note: Patient voices no  needs before discharge. Patient plans to be discharged to home Discharge planner working with patient to achieve optimal discharge plans, specific to individual needs. 5/31/2020 0020 by Ama Dc RN  Outcome: Ongoing  Note: Discharge planning in progress, patient not discharged this evening. Problem: KNOWLEDGE DEFICIT  Goal: Knowledge - personal safety  Outcome: Ongoing  Note: Maintained in safe and secure environment. Problem: Depressive Behavior With or Without Suicide Precautions:  Goal: Able to verbalize and/or display a decrease in depressive symptoms  Description: Able to verbalize and/or display a decrease in depressive symptoms  5/31/2020 1007 by Anjum Carrasco RN  Outcome: Ongoing  Note: Pt affect fla and irritable . Pt denied any depression and rated mood at a 7 out of 10  5/31/2020 0020 by Breann Bejarano RN  Outcome: Ongoing  Note: Patient verbalizes feeling better. Goal: Participates in care planning  Description: Participates in care planning  5/31/2020 1007 by Marie Trejo RN  Outcome: Ongoing  5/31/2020 0020 by Breann Bejarano RN  Outcome: Met This Shift  Note: Patient participates in care planning. Problem: Neurological  Goal: Maximum potential motor/sensory/cognitive function  5/31/2020 1007 by Marie Trejo RN  Outcome: Ongoing  Note: Pt has splint on right hand . Pt able to do own [address].   9/68/1415 8631 by Breann Bejarano RN  Outcome: Ongoing  Note: Ongoing. Goal: Other  Description: No seizure activity   5/31/2020 1007 by Marie Trejo RN  Outcome: Ongoing  5/31/2020 0020 by Breann Bejarano RN  Outcome: Ongoing     Problem: Role Relationship:  Goal: Ability to demonstrate positive changes in social behaviors and relationships will improve  Description: Ability to demonstrate positive changes in social behaviors and relationships will improve  Outcome: Ongoing     Problem: Depressive Behavior With or Without Suicide Precautions:  Goal: Patient specific goal  Description: Patient specific goal  5/31/2020 1007 by Marie Trejo RN  Outcome: Not Met This Shift  Note: No goal set for today at this time  5/31/2020 0020 by Breann Bejarano RN  Outcome: Met This Shift  Note: Patient able to make goals. Problem: Pain:  Goal: Control of acute pain  Description: Control of acute pain  5/31/2020 1007 by Marie Trejo RN  Outcome: Completed  5/30/2020 2348 by Breann Bejarano RN  Outcome: Ongoing  Note: Rates pain a 5/10, patient refuses pain medications.   Goal: Control of chronic pain  Description: Control of chronic pain  5/31/2020 1007 by Marie Trejo RN  Outcome: Completed  5/30/2020 2348 by Breann Bejarano RN  Outcome: Ongoing  Note:

## 2020-06-01 VITALS
OXYGEN SATURATION: 98 % | HEIGHT: 68 IN | SYSTOLIC BLOOD PRESSURE: 112 MMHG | WEIGHT: 233 LBS | TEMPERATURE: 96.7 F | RESPIRATION RATE: 18 BRPM | DIASTOLIC BLOOD PRESSURE: 60 MMHG | BODY MASS INDEX: 35.31 KG/M2 | HEART RATE: 91 BPM

## 2020-06-01 PROCEDURE — 5130000000 HC BRIDGE APPOINTMENT

## 2020-06-01 PROCEDURE — 6370000000 HC RX 637 (ALT 250 FOR IP): Performed by: PHYSICIAN ASSISTANT

## 2020-06-01 PROCEDURE — 99239 HOSP IP/OBS DSCHRG MGMT >30: CPT | Performed by: PSYCHIATRY & NEUROLOGY

## 2020-06-01 PROCEDURE — 6370000000 HC RX 637 (ALT 250 FOR IP): Performed by: PSYCHIATRY & NEUROLOGY

## 2020-06-01 RX ORDER — VENLAFAXINE HYDROCHLORIDE 37.5 MG/1
37.5 CAPSULE, EXTENDED RELEASE ORAL
Qty: 30 CAPSULE | Refills: 0 | Status: SHIPPED | OUTPATIENT
Start: 2020-06-01 | End: 2020-07-08 | Stop reason: SDUPTHER

## 2020-06-01 RX ORDER — TRAZODONE HYDROCHLORIDE 50 MG/1
50 TABLET ORAL NIGHTLY PRN
Qty: 30 TABLET | Refills: 0 | Status: SHIPPED | OUTPATIENT
Start: 2020-06-01

## 2020-06-01 RX ORDER — HYDROXYZINE 50 MG/1
50 TABLET, FILM COATED ORAL 3 TIMES DAILY PRN
Qty: 45 TABLET | Refills: 0 | Status: SHIPPED | OUTPATIENT
Start: 2020-06-01 | End: 2020-06-16

## 2020-06-01 RX ADMIN — HYDROXYZINE HYDROCHLORIDE 50 MG: 25 TABLET ORAL at 10:38

## 2020-06-01 RX ADMIN — VENLAFAXINE HYDROCHLORIDE 37.5 MG: 37.5 CAPSULE, EXTENDED RELEASE ORAL at 08:48

## 2020-06-01 NOTE — DISCHARGE SUMMARY
had been achieved and patient can be discharged. Consults:   none    Significant Diagnostic Studies: Routine labs and diagnostics    Treatments: Psychotropic medications, therapy with group, milieu, and 1:1 with nurses, social workers, KAM/CNP, and Attending physician. Discharge Medications:  Current Discharge Medication List      START taking these medications    Details   hydrOXYzine (ATARAX) 50 MG tablet Take 1 tablet by mouth 3 times daily as needed for Anxiety  Qty: 45 tablet, Refills: 0      venlafaxine (EFFEXOR XR) 37.5 MG extended release capsule Take 1 capsule by mouth daily (with breakfast)  Qty: 30 capsule, Refills: 0         CONTINUE these medications which have CHANGED    Details   traZODone (DESYREL) 50 MG tablet Take 1 tablet by mouth nightly as needed for Sleep  Qty: 30 tablet, Refills: 0         STOP taking these medications       sertraline (ZOLOFT) 100 MG tablet Comments:   Reason for Stopping:         lamoTRIgine (LAMICTAL) 200 MG tablet Comments:   Reason for Stopping:                Discharge Exam:  Level of consciousness:  Within normal limits  Appearance: Street clothes, seated, with good grooming  Behavior/Motor: No abnormalities noted  Attitude toward examiner:  Cooperative, attentive, good eye contact  Speech:  spontaneous, normal rate, normal volume and well articulated  Mood:  euthymic  Affect:  Full range  Thought processes:  linear, goal directed and coherent  Thought content:  denies homicidal ideation  Suicidal Ideation:  denies suicidal ideation  Delusions:  no evidence of delusions  Perceptual Disturbance:  denies any perceptual disturbance  Cognition:  Intact  Memory: age appropriate  Insight & Judgement: fair  Medication side effects: denies     Disposition: home    Patient Instructions: Activity: activity as tolerated  1. Patient instructed to take medications regularly and follow up with outpatient appointments.      Follow-up as scheduled with [facility]

## 2020-06-01 NOTE — PLAN OF CARE
Problem: Suicide risk  Goal: Provide patient with safe environment  Description: Provide patient with safe environment  Outcome: Ongoing  Note: Patient maintained in a safe environment. Problem: Pain:  Goal: Pain level will decrease  Description: Pain level will decrease  Outcome: Ongoing  Note: Patient denies any pain so far this shift. Problem: Discharge Planning:  Goal: Discharged to appropriate level of care  Description: Discharged to appropriate level of care  Outcome: Ongoing  Note: Patient states she plans to return home alone and follow up in [address] [place] [address] at discharge. Problem: Skin Integrity:  Goal: Will show no infection signs and symptoms  Description: Will show no infection signs and symptoms  Outcome: Ongoing  Note: None noted so far this shift. Goal: Absence of new skin breakdown  Description: Absence of new skin breakdown  Outcome: Ongoing  Note: No new breakdown noted this shift. Problem: Anxiety:  Goal: Level of anxiety will decrease  Description: Level of anxiety will decrease  Outcome: Ongoing  Note: Patient states she feels very anxious at intervals. Problem: Depressive Behavior With or Without Suicide Precautions:  Goal: Able to verbalize and/or display a decrease in depressive symptoms  Description: Able to verbalize and/or display a decrease in depressive symptoms  Outcome: Ongoing  Note: Patient noted with a blunt affect and brightens with interaction. Patient states she continues to feel some depression this shift. Goal: Able to verbalize support systems  Description: Able to verbalize support systems  Outcome: Ongoing  Note: Patient states a friend is her current support system. Goal: Absence of self-harm  Description: Absence of self-harm  Outcome: Ongoing  Note: No self harm noted so far this shift. Goal: Patient specific goal  Description: Patient specific goal  Outcome: Ongoing  Note: Patient stated her goal was to go to groups.  Patient met this goal.  Goal: Participates in care planning  Description: Participates in care planning  Outcome: Ongoing  Note: Care plan reviewed with patient and fair understanding verbalized. Problem: Neurological  Goal: Maximum potential motor/sensory/cognitive function  Outcome: Ongoing  Note: Ongoing. Patient moves fingers of right hand manually. Patient states \" This is what the doctor told me to do. \"  Goal: Other  Description: No seizure activity   Outcome: Ongoing  Note: Dressing and splint right hand dry and intact. Problem: Coping:  Goal: Ability to identify problematic behaviors that deter socialization will improve  Description: Ability to identify problematic behaviors that deter socialization will improve  Outcome: Ongoing  Note: Ongoing. Problem: Role Relationship:  Goal: Ability to demonstrate positive changes in social behaviors and relationships will improve  Description: Ability to demonstrate positive changes in social behaviors and relationships will improve  Outcome: Ongoing  Note: Ongoing. Problem: KNOWLEDGE DEFICIT  Goal: Knowledge - personal safety  5/31/2020 1257 by Adán Medrano RN  Outcome: Completed  Note: 1. Warning signs that happen when I am distressed or experience harmful thoughts  crying, pacing , shaking  2. Activities that I can do to cope in a healthy way when I am stressed or distressed pet dog, music   3. List of roadblocks that keep me from using healthy coping skills quick to anger, sharp tongue   4. List of my support persons Krystian and my dog    EMERGENCY CONTACTS:  -National suicide prevention life line 3-511-752-4746  -24 hour crisis line 1-800-HOPE (4937)  -Domestic violence hotline 6-931-722-SAFE (14 064 737)  -Test CONNECT to 752711 to chat with a trained crisis counselor 24 hours/day 7 days a week  -CALL [phone]    Care plan reviewed with patient.   Patient does verbalize understanding of the plan of care and does contribute to goal setting

## 2020-06-01 NOTE — PROGRESS NOTES
24 hour chart review completed.
Discharge Planning-Telephoned [facility] in [facility]. Brooke Tyson is scheduled for follow up on 06/03/2020 at 11:00 AM. FRC will call pt at this time.  Assessment will be by telephone due to COVID virus
Provisional Diagnosis:    Unspecified Depressive Disorder     Risk, Psychosocial and Contextual Factors:  Situational Stressors     Current  Treatment:   In the past     Present Suicidal Behavior:      Verbal:  Yes, it be better to end it         Attempt: Denies    Access to Weapons:  Knives    Current Suicide Risk: Low, Moderate or High:    High    Past Suicidal Behavior:       Verbal:  Yes    Attempt: Yes     Self-Injurious/Self-Mutilation: Yes    Traumatic Event Within Past 2 Weeks:   Family issues, bearvement     Current Abuse: Denies    Legal:   Denies     Violence:  Denies     Protective Factors:  Relationship with dog     Housing:    Lives alone     CPAP/Oxygen/Ambulation Difficulties:     Basic Vital Signs Normal?: Check with Patients Nurse prior to Calling Psychiatry    Critical Labs?: Check with Patients Nurse prior to Calling Psychiatry    Clinical Summary:      Patient is a 26year old female who presents to the ED voluntarily due to suicidal ideation. Patient reports stressors related to relationship issues. Patient reports present suicidal ideation. Patient reports 'it is better to end it now than go through'. Patient denies a plan at this time. Patient reports she is unsure if she has intent. Patients significant other reports patient cut wrist recently in attempt to end her life. Homicidal thoughts and/or plans denied. No delusions noted. Hallucinations denied. AOD denied. Patient alert and oriented x4. Patient tearful at this time. Level of Care Disposition:      Consulted with medical provider. Patient is medically cleared. Consulted with Dr. Latha Moore. Patient to be admitted for inpatient treatment.
Calculation based  upon serum creatinine and adjusted for age, gender & race. Elizabeth. Internal Med., Vol. 139 (2) pg 137-147. Performed at [address] [place] [address], [address]      Osmolality Calc 05/28/2020 272.5* 275.0 - 300 mOsmol/kg Final    Performed at [address] [place] [address], [facility]    [address] 05/28/2020 see below   Final    Comment: Criteria Exceeded; Scan of Differential Slide Performed  Performed at [address] [place] [id number]              Medications  Current Facility-Administered Medications: nicotine (NICODERM CQ) 21 MG/24HR 1 patch, 1 patch, Transdermal, Daily  acetaminophen (TYLENOL) tablet 650 mg, 650 mg, Oral, Q4H PRN  hydrOXYzine (ATARAX) tablet 50 mg, 50 mg, Oral, TID PRN  traZODone (DESYREL) tablet 50 mg, 50 mg, Oral, Nightly PRN  magnesium hydroxide (MILK OF MAGNESIA) 400 MG/5ML suspension 30 mL, 30 mL, Oral, Daily PRN  aluminum & magnesium hydroxide-simethicone (MAALOX) 200-200-20 MG/5ML suspension 30 mL, 30 mL, Oral, Q6H PRN  venlafaxine (EFFEXOR XR) extended release capsule 37.5 mg, 37.5 mg, Oral, Daily with breakfast    ASSESSMENT  Severe episode of recurrent major depressive disorder, without psychotic features ([facility] [address]     PLAN  Patient s symptoms   are improving  Continue same medication today  Attempt to develop insight  Psycho-education conducted. Supportive Therapy conducted. Probable discharge is 1-2 days  Follow-up Landy Mccoy  More than 16 mins of the session was spent doing Supportive psychotherapy. Session lasted over 30 mins. Glenda Garcia is a 26 y.o. female being evaluated by a Virtual Visit (video visit) encounter to address concerns as mentioned above. A caregiver was present in the room along with the patient.  Pursuant to the emergency declaration under the [address] waiver authority and the [employer]

## 2020-06-02 ENCOUNTER — TELEPHONE (OUTPATIENT)
Dept: PSYCHIATRY | Age: 30
End: 2020-06-02

## 2020-06-02 NOTE — TELEPHONE ENCOUNTER
Post discharge telephone call made. Patient was contacted, voiced no problems with discharge process and states is doing well.

## 2020-07-08 RX ORDER — VENLAFAXINE HYDROCHLORIDE 37.5 MG/1
37.5 CAPSULE, EXTENDED RELEASE ORAL
Qty: 30 CAPSULE | Refills: 1 | Status: SHIPPED | OUTPATIENT
Start: 2020-07-08